# Patient Record
Sex: MALE | Race: BLACK OR AFRICAN AMERICAN | NOT HISPANIC OR LATINO | Employment: FULL TIME | URBAN - METROPOLITAN AREA
[De-identification: names, ages, dates, MRNs, and addresses within clinical notes are randomized per-mention and may not be internally consistent; named-entity substitution may affect disease eponyms.]

---

## 2024-10-07 ENCOUNTER — HOSPITAL ENCOUNTER (EMERGENCY)
Facility: HOSPITAL | Age: 44
Discharge: HOME | End: 2024-10-07
Attending: EMERGENCY MEDICINE

## 2024-10-07 VITALS
RESPIRATION RATE: 18 BRPM | HEART RATE: 61 BPM | SYSTOLIC BLOOD PRESSURE: 130 MMHG | OXYGEN SATURATION: 98 % | HEIGHT: 74 IN | DIASTOLIC BLOOD PRESSURE: 84 MMHG | WEIGHT: 239.19 LBS | BODY MASS INDEX: 30.7 KG/M2 | TEMPERATURE: 98 F

## 2024-10-07 DIAGNOSIS — I10 CHRONIC HYPERTENSION: ICD-10-CM

## 2024-10-07 DIAGNOSIS — R10.9 ABDOMINAL PAIN: ICD-10-CM

## 2024-10-07 DIAGNOSIS — K40.90 LEFT INGUINAL HERNIA: Primary | ICD-10-CM

## 2024-10-07 LAB
ALBUMIN SERPL BCP-MCNC: 4.5 G/DL (ref 3.5–5.2)
ALP SERPL-CCNC: 56 U/L (ref 55–135)
ALT SERPL W/O P-5'-P-CCNC: 14 U/L (ref 10–44)
AMPHET+METHAMPHET UR QL: NEGATIVE
ANION GAP SERPL CALC-SCNC: 11 MMOL/L (ref 8–16)
AST SERPL-CCNC: 18 U/L (ref 10–40)
BARBITURATES UR QL SCN>200 NG/ML: NEGATIVE
BASOPHILS # BLD AUTO: 0.02 K/UL (ref 0–0.2)
BASOPHILS NFR BLD: 0.4 % (ref 0–1.9)
BENZODIAZ UR QL SCN>200 NG/ML: NEGATIVE
BILIRUB SERPL-MCNC: 0.7 MG/DL (ref 0.1–1)
BILIRUB UR QL STRIP: NEGATIVE
BUN SERPL-MCNC: 13 MG/DL (ref 6–20)
BZE UR QL SCN: NEGATIVE
CALCIUM SERPL-MCNC: 9.8 MG/DL (ref 8.7–10.5)
CANNABINOIDS UR QL SCN: NEGATIVE
CHLORIDE SERPL-SCNC: 106 MMOL/L (ref 95–110)
CLARITY UR: CLEAR
CO2 SERPL-SCNC: 23 MMOL/L (ref 23–29)
COLOR UR: YELLOW
CREAT SERPL-MCNC: 1.4 MG/DL (ref 0.5–1.4)
CREAT UR-MCNC: 147 MG/DL (ref 23–375)
DIFFERENTIAL METHOD BLD: ABNORMAL
EOSINOPHIL # BLD AUTO: 0 K/UL (ref 0–0.5)
EOSINOPHIL NFR BLD: 0.4 % (ref 0–8)
ERYTHROCYTE [DISTWIDTH] IN BLOOD BY AUTOMATED COUNT: 11.2 % (ref 11.5–14.5)
EST. GFR  (NO RACE VARIABLE): >60 ML/MIN/1.73 M^2
GLUCOSE SERPL-MCNC: 104 MG/DL (ref 70–110)
GLUCOSE UR QL STRIP: NEGATIVE
HCT VFR BLD AUTO: 40.7 % (ref 40–54)
HGB BLD-MCNC: 14.4 G/DL (ref 14–18)
HGB UR QL STRIP: NEGATIVE
IMM GRANULOCYTES # BLD AUTO: 0.01 K/UL (ref 0–0.04)
IMM GRANULOCYTES NFR BLD AUTO: 0.2 % (ref 0–0.5)
KETONES UR QL STRIP: NEGATIVE
LEUKOCYTE ESTERASE UR QL STRIP: NEGATIVE
LIPASE SERPL-CCNC: 74 U/L (ref 4–60)
LYMPHOCYTES # BLD AUTO: 1.3 K/UL (ref 1–4.8)
LYMPHOCYTES NFR BLD: 25.2 % (ref 18–48)
MCH RBC QN AUTO: 29.7 PG (ref 27–31)
MCHC RBC AUTO-ENTMCNC: 35.4 G/DL (ref 32–36)
MCV RBC AUTO: 84 FL (ref 82–98)
METHADONE UR QL SCN>300 NG/ML: NEGATIVE
MONOCYTES # BLD AUTO: 0.3 K/UL (ref 0.3–1)
MONOCYTES NFR BLD: 6 % (ref 4–15)
NEUTROPHILS # BLD AUTO: 3.6 K/UL (ref 1.8–7.7)
NEUTROPHILS NFR BLD: 67.8 % (ref 38–73)
NITRITE UR QL STRIP: NEGATIVE
NRBC BLD-RTO: 0 /100 WBC
OPIATES UR QL SCN: NEGATIVE
PCP UR QL SCN>25 NG/ML: NEGATIVE
PH UR STRIP: 7 [PH] (ref 5–8)
PLATELET # BLD AUTO: 206 K/UL (ref 150–450)
PMV BLD AUTO: 10.1 FL (ref 9.2–12.9)
POTASSIUM SERPL-SCNC: 4.2 MMOL/L (ref 3.5–5.1)
PROT SERPL-MCNC: 8.5 G/DL (ref 6–8.4)
PROT UR QL STRIP: NEGATIVE
RBC # BLD AUTO: 4.85 M/UL (ref 4.6–6.2)
SODIUM SERPL-SCNC: 140 MMOL/L (ref 136–145)
SP GR UR STRIP: >1.03 (ref 1–1.03)
TOXICOLOGY INFORMATION: NORMAL
TROPONIN I SERPL DL<=0.01 NG/ML-MCNC: <0.006 NG/ML (ref 0–0.03)
URN SPEC COLLECT METH UR: ABNORMAL
UROBILINOGEN UR STRIP-ACNC: NEGATIVE EU/DL
WBC # BLD AUTO: 5.32 K/UL (ref 3.9–12.7)

## 2024-10-07 PROCEDURE — 80307 DRUG TEST PRSMV CHEM ANLYZR: CPT | Performed by: EMERGENCY MEDICINE

## 2024-10-07 PROCEDURE — 63600175 PHARM REV CODE 636 W HCPCS: Performed by: EMERGENCY MEDICINE

## 2024-10-07 PROCEDURE — 84484 ASSAY OF TROPONIN QUANT: CPT | Performed by: EMERGENCY MEDICINE

## 2024-10-07 PROCEDURE — 99285 EMERGENCY DEPT VISIT HI MDM: CPT | Mod: 25

## 2024-10-07 PROCEDURE — 93005 ELECTROCARDIOGRAM TRACING: CPT

## 2024-10-07 PROCEDURE — 93010 ELECTROCARDIOGRAM REPORT: CPT | Mod: ,,, | Performed by: STUDENT IN AN ORGANIZED HEALTH CARE EDUCATION/TRAINING PROGRAM

## 2024-10-07 PROCEDURE — 85025 COMPLETE CBC W/AUTO DIFF WBC: CPT | Performed by: EMERGENCY MEDICINE

## 2024-10-07 PROCEDURE — 81003 URINALYSIS AUTO W/O SCOPE: CPT | Mod: 59 | Performed by: EMERGENCY MEDICINE

## 2024-10-07 PROCEDURE — 96374 THER/PROPH/DIAG INJ IV PUSH: CPT

## 2024-10-07 PROCEDURE — 25500020 PHARM REV CODE 255: Performed by: EMERGENCY MEDICINE

## 2024-10-07 PROCEDURE — 83690 ASSAY OF LIPASE: CPT | Performed by: EMERGENCY MEDICINE

## 2024-10-07 PROCEDURE — 80053 COMPREHEN METABOLIC PANEL: CPT | Performed by: EMERGENCY MEDICINE

## 2024-10-07 PROCEDURE — 25000003 PHARM REV CODE 250: Performed by: EMERGENCY MEDICINE

## 2024-10-07 RX ORDER — SUCRALFATE 1 G/10ML
1 SUSPENSION ORAL
Status: COMPLETED | OUTPATIENT
Start: 2024-10-07 | End: 2024-10-07

## 2024-10-07 RX ORDER — METOCLOPRAMIDE HYDROCHLORIDE 5 MG/ML
10 INJECTION INTRAMUSCULAR; INTRAVENOUS
Status: COMPLETED | OUTPATIENT
Start: 2024-10-07 | End: 2024-10-07

## 2024-10-07 RX ORDER — AMLODIPINE BESYLATE 5 MG/1
10 TABLET ORAL
Status: COMPLETED | OUTPATIENT
Start: 2024-10-07 | End: 2024-10-07

## 2024-10-07 RX ADMIN — IOHEXOL 100 ML: 350 INJECTION, SOLUTION INTRAVENOUS at 11:10

## 2024-10-07 RX ADMIN — AMLODIPINE BESYLATE 10 MG: 5 TABLET ORAL at 10:10

## 2024-10-07 RX ADMIN — SUCRALFATE 1 G: 1 SUSPENSION ORAL at 09:10

## 2024-10-07 RX ADMIN — METOCLOPRAMIDE 10 MG: 5 INJECTION, SOLUTION INTRAMUSCULAR; INTRAVENOUS at 09:10

## 2024-10-07 NOTE — ED PROVIDER NOTES
SCRIBE #1 NOTE: I, Dada Harding, am scribing for, and in the presence of, Neha Deluca MD. I have scribed the entire note.       History     Chief Complaint   Patient presents with    Abdominal Pain     Abd pain since 9/27 - was dx with pancreatitis and admitted since discharged pain has returned     Review of patient's allergies indicates:  No Known Allergies      History of Present Illness     HPI    10/7/2024, 9:32 AM  History obtained from the patient      History of Present Illness: Maki Luong is a 44 y.o. male patient who presents to the Emergency Department for evaluation of abd pain which onset gradually since 09/23/24. Pt went to [facility] and f/u with PCP; pt states the physicians are uncertain what could be causing the pt pain. Pt's pain was unimproved with 2 shots of morphine with his hospital visit. The pt was prescribed Bentyl and Zofran with no improvement of pain. Pt's pain is localized to the LUQ. Pt states he does not drink nor smoke.  Pt usually takes his BP medications in the morning but has not taken his Amlodipine this morning. Pain is worsened with breathing and eating solid foods. Pt has a decrease in appetite as a result. Pt has a s/p hx of cholecystectomy. Symptoms are constant and moderate in severity. No associated sxs. Patient denies any increased belching, no trouble urinating, no constipation, no N/V, fever, constipation, CP, back pain, dizziness, and all other sxs at this time. Pt is passing gas and has normal BMs with no black tarry or bloody stool. No further complaints or concerns at this time.       Arrival mode: Personal vehicle     PCP: Alma Malik NP        Past Medical History:  Past Medical History:   Diagnosis Date    Essential (primary) hypertension        Past Surgical History:  No past surgical history on file.      Family History:  No family history on file.    Social History:  Social History     Tobacco Use    Smoking status: Not on file    Smokeless tobacco:  Not on file   Substance and Sexual Activity    Alcohol use: Not on file    Drug use: Not on file    Sexual activity: Not on file        Review of Systems     Review of Systems   Constitutional:  Positive for appetite change (decreased). Negative for chills and fever.   HENT:  Negative for congestion and sore throat.    Respiratory:  Negative for cough and shortness of breath.    Cardiovascular:  Negative for chest pain.   Gastrointestinal:  Positive for abdominal pain (LUQ). Negative for blood in stool, constipation, nausea and vomiting.        (-) belching   Genitourinary:  Negative for dysuria.   Musculoskeletal:  Negative for back pain.   Skin:  Negative for rash.   Neurological:  Negative for dizziness, weakness, numbness and headaches.   Hematological:  Does not bruise/bleed easily.   All other systems reviewed and are negative.       Physical Exam     Initial Vitals [10/07/24 0919]   BP Pulse Resp Temp SpO2   (!) 161/100 82 18 97.9 °F (36.6 °C) 99 %      MAP       --          Physical Exam  Nursing Notes and Vital Signs Reviewed.  Constitutional: Patient is in no acute distress. Anxious. Well-developed and well-nourished.  Head: Atraumatic. Normocephalic.  Eyes: PERRL. EOM intact. Conjunctivae are not pale. No scleral icterus.  ENT: Mucous membranes are moist. Oropharynx is clear and symmetric.    Neck: Supple. Full ROM. No lymphadenopathy.  Cardiovascular: Regular rate. Regular rhythm. No murmurs, rubs, or gallops. Distal pulses are 2+ and symmetric.  Pulmonary/Chest: No respiratory distress. Clear to auscultation bilaterally. No wheezing or rales.  Abdominal: Soft and non-distended.  There is epigastric and generalized lower abd tenderness.  No rebound, guarding, or rigidity. Good bowel sounds.  Genitourinary: No CVA tenderness Reducible left inguinal hernia.   Musculoskeletal: Moves all extremities. No obvious deformities. No edema. No calf tenderness.  Skin: Warm and dry.  Neurological:  Alert, awake, and  "appropriate.  Normal speech.  No acute focal neurological deficits are appreciated.  Psychiatric: Normal affect. Anxious. Good eye contact. Appropriate in content.     ED Course   Procedures  ED Vital Signs:  Vitals:    10/07/24 0919 10/07/24 0930 10/07/24 1015 10/07/24 1030   BP: (!) 161/100 (!) 184/108 (!) 144/83 (!) 146/86   Pulse: 82  64 62   Resp: 18  11 10   Temp: 97.9 °F (36.6 °C)      TempSrc: Oral      SpO2: 99%  96% 97%   Weight: 108.5 kg (239 lb 3.2 oz)      Height: 6' 2" (1.88 m)       10/07/24 1100 10/07/24 1245 10/07/24 1300   BP: (!) 143/92 131/84 130/84   Pulse: 64 65 61   Resp: 12 18 18   Temp:      TempSrc:      SpO2: 98% 97% 98%   Weight:      Height:          Abnormal Lab Results:  Labs Reviewed   CBC W/ AUTO DIFFERENTIAL - Abnormal       Result Value    WBC 5.32      RBC 4.85      Hemoglobin 14.4      Hematocrit 40.7      MCV 84      MCH 29.7      MCHC 35.4      RDW 11.2 (*)     Platelets 206      MPV 10.1      Immature Granulocytes 0.2      Gran # (ANC) 3.6      Immature Grans (Abs) 0.01      Lymph # 1.3      Mono # 0.3      Eos # 0.0      Baso # 0.02      nRBC 0      Gran % 67.8      Lymph % 25.2      Mono % 6.0      Eosinophil % 0.4      Basophil % 0.4      Differential Method Automated     COMPREHENSIVE METABOLIC PANEL - Abnormal    Sodium 140      Potassium 4.2      Chloride 106      CO2 23      Glucose 104      BUN 13      Creatinine 1.4      Calcium 9.8      Total Protein 8.5 (*)     Albumin 4.5      Total Bilirubin 0.7      Alkaline Phosphatase 56      AST 18      ALT 14      eGFR >60      Anion Gap 11     LIPASE - Abnormal    Lipase 74 (*)    URINALYSIS, REFLEX TO URINE CULTURE - Abnormal    Specimen UA Urine, Clean Catch      Color, UA Yellow      Appearance, UA Clear      pH, UA 7.0      Specific Gravity, UA >1.030 (*)     Protein, UA Negative      Glucose, UA Negative      Ketones, UA Negative      Bilirubin (UA) Negative      Occult Blood UA Negative      Nitrite, UA Negative      " Urobilinogen, UA Negative      Leukocytes, UA Negative      Narrative:     Specimen Source->Urine   DRUG SCREEN PANEL, URINE EMERGENCY    Benzodiazepines Negative      Methadone metabolites Negative      Cocaine (Metab.) Negative      Opiate Scrn, Ur Negative      Barbiturate Screen, Ur Negative      Amphetamine Screen, Ur Negative      THC Negative      Phencyclidine Negative      Creatinine, Urine 147.0      Toxicology Information SEE COMMENT      Narrative:     Specimen Source->Urine   TROPONIN I    Troponin I <0.006          All Lab Results:  Results for orders placed or performed during the hospital encounter of 10/07/24   CBC W/ AUTO DIFFERENTIAL    Collection Time: 10/07/24  9:43 AM   Result Value Ref Range    WBC 5.32 3.90 - 12.70 K/uL    RBC 4.85 4.60 - 6.20 M/uL    Hemoglobin 14.4 14.0 - 18.0 g/dL    Hematocrit 40.7 40.0 - 54.0 %    MCV 84 82 - 98 fL    MCH 29.7 27.0 - 31.0 pg    MCHC 35.4 32.0 - 36.0 g/dL    RDW 11.2 (L) 11.5 - 14.5 %    Platelets 206 150 - 450 K/uL    MPV 10.1 9.2 - 12.9 fL    Immature Granulocytes 0.2 0.0 - 0.5 %    Gran # (ANC) 3.6 1.8 - 7.7 K/uL    Immature Grans (Abs) 0.01 0.00 - 0.04 K/uL    Lymph # 1.3 1.0 - 4.8 K/uL    Mono # 0.3 0.3 - 1.0 K/uL    Eos # 0.0 0.0 - 0.5 K/uL    Baso # 0.02 0.00 - 0.20 K/uL    nRBC 0 0 /100 WBC    Gran % 67.8 38.0 - 73.0 %    Lymph % 25.2 18.0 - 48.0 %    Mono % 6.0 4.0 - 15.0 %    Eosinophil % 0.4 0.0 - 8.0 %    Basophil % 0.4 0.0 - 1.9 %    Differential Method Automated    Comp. Metabolic Panel    Collection Time: 10/07/24  9:43 AM   Result Value Ref Range    Sodium 140 136 - 145 mmol/L    Potassium 4.2 3.5 - 5.1 mmol/L    Chloride 106 95 - 110 mmol/L    CO2 23 23 - 29 mmol/L    Glucose 104 70 - 110 mg/dL    BUN 13 6 - 20 mg/dL    Creatinine 1.4 0.5 - 1.4 mg/dL    Calcium 9.8 8.7 - 10.5 mg/dL    Total Protein 8.5 (H) 6.0 - 8.4 g/dL    Albumin 4.5 3.5 - 5.2 g/dL    Total Bilirubin 0.7 0.1 - 1.0 mg/dL    Alkaline Phosphatase 56 55 - 135 U/L    AST 18  10 - 40 U/L    ALT 14 10 - 44 U/L    eGFR >60 >60 mL/min/1.73 m^2    Anion Gap 11 8 - 16 mmol/L   Lipase    Collection Time: 10/07/24  9:43 AM   Result Value Ref Range    Lipase 74 (H) 4 - 60 U/L   Troponin I    Collection Time: 10/07/24  9:43 AM   Result Value Ref Range    Troponin I <0.006 0.000 - 0.026 ng/mL   EKG 12-lead    Collection Time: 10/07/24  9:48 AM   Result Value Ref Range    QRS Duration 70 ms    [facility] QTC Calculation 424 ms   Urinalysis, Reflex to Urine Culture Urine, Clean Catch    Collection Time: 10/07/24 12:49 PM    Specimen: Urine   Result Value Ref Range    Specimen UA Urine, Clean Catch     Color, UA Yellow Yellow, Straw, Sho    Appearance, UA Clear Clear    pH, UA 7.0 5.0 - 8.0    Specific Gravity, UA >1.030 (A) 1.005 - 1.030    Protein, UA Negative Negative    Glucose, UA Negative Negative    Ketones, UA Negative Negative    Bilirubin (UA) Negative Negative    Occult Blood UA Negative Negative    Nitrite, UA Negative Negative    Urobilinogen, UA Negative <2.0 EU/dL    Leukocytes, UA Negative Negative   Drug screen panel, emergency    Collection Time: 10/07/24 12:49 PM   Result Value Ref Range    Benzodiazepines Negative Negative    Methadone metabolites Negative Negative    Cocaine (Metab.) Negative Negative    Opiate Scrn, Ur Negative Negative    Barbiturate Screen, Ur Negative Negative    Amphetamine Screen, Ur Negative Negative    THC Negative Negative    Phencyclidine Negative Negative    Creatinine, Urine 147.0 23.0 - 375.0 mg/dL    Toxicology Information SEE COMMENT          Imaging Results:  Imaging Results              CT Abdomen Pelvis With IV Contrast NO Oral Contrast (Final result)  Result time 10/07/24 12:14:24      Final result by Hebert Hassan MD (10/07/24 12:14:24)                   Impression:      Large left inguinal hernia containing portion of sigmoid colon without evidence of obstruction.    All CT scans at this facility use dose modulation, iterative reconstruction,  and/or weight based dosing when appropriate to reduce radiation dose to as low as reasonable achievable.      Electronically signed by: Hebert Hassan MD  Date:    10/07/2024  Time:    12:14               Narrative:    EXAMINATION:  CT ABDOMEN PELVIS WITH IV CONTRAST    CLINICAL HISTORY:  LLQ abdominal pain;    TECHNIQUE:  Low dose axial images, sagittal and coronal reformations were obtained from the lung bases to the pubic symphysis following the IV administration of 100 mL of Omnipaque 350.  Oral contrast was not administered.    COMPARISON:  10/07/2024, 09/23/2024    FINDINGS:  Heart: Normal size. No effusion.    Lung Bases: Clear.    Liver: Normal size and attenuation. No focal lesions.  Portal vein is patent.  9 mm cyst within segment 5.    Gallbladder: Post cholecystectomy.    Bile Ducts: No dilatation.    Pancreas: No mass. No peripancreatic fat stranding.    Spleen: Normal.    Adrenals: Normal.    Kidneys/Ureters: Normal enhancement.  No mass or  hydroureteronephrosis.    Bladder: No wall thickening.    Reproductive organs: Normal.    GI Tract/Mesentery: No evidence of bowel obstruction or inflammation.  Appendix appears normal.    Peritoneal Space: No ascites or free air.    Retroperitoneum: No significant adenopathy.    Abdominal wall: Large left inguinal hernia containing portion of sigmoid colon without evidence of obstruction.    Vasculature: No aneurysm.    Bones: No acute fracture. No suspicious lytic or sclerotic lesions.                                       X-Ray Abdomen Flat And Erect (Final result)  Result time 10/07/24 09:51:07      Final result by Aung Lima MD (10/07/24 09:51:07)                   Impression:      1.  Negative for acute process.    2.  Incidental findings as noted above.      Electronically signed by: Aung Lima MD  Date:    10/07/2024  Time:    09:51               Narrative:    EXAMINATION:  XR ABDOMEN FLAT AND ERECT    CLINICAL HISTORY:  Abdominal  Pain;    TECHNIQUE:  Flat and erect AP views of the abdomen were performed.    COMPARISON:  None    FINDINGS:  Appropriate stool burden.  Normal bowel gas pattern.  Negative for free air.    Cholecystectomy clips.  There are phleboliths versus ureteroliths some overlying the pelvis.  Lung bases are clear.  Mild sclerosis of the superior acetabula.  Negative for osseous abnormalities otherwise.                                       The EKG was ordered, reviewed, and independently interpreted by the ED provider.  Interpretation time: 09:48  Rate: 72 BPM  Rhythm: normal sinus rhythm  Interpretation: No acute ST changes. No STEMI.             The Emergency Provider reviewed the vital signs and test results, which are outlined above.     ED Discussion       12:58 PM: Reassessed pt at this time. Discussed with pt all pertinent ED information and results. Discussed pt dx and plan of tx. Gave pt all f/u and return to the ED instructions. All questions and concerns were addressed at this time. Pt expresses understanding of information and instructions, and is comfortable with plan to discharge. Pt is stable for discharge.    I discussed with patient and/or family/caretaker that evaluation in the ED does not suggest any emergent or life threatening medical conditions requiring immediate intervention beyond what was provided in the ED, and I believe patient is safe for discharge.  Regardless, an unremarkable evaluation in the ED does not preclude the development or presence of a serious of life threatening condition. As such, patient was instructed to return immediately for any worsening or change in current symptoms.     ED Course as of 10/08/24 2308   Mon Oct 07, 2024   1013 WBC: 5.32 [AB]   1013 Hemoglobin: 14.4 [AB]   1013 Hematocrit: 40.7 [AB]   1013 Sodium: 140 [AB]   1013 Potassium: 4.2 [AB]   1013 BUN: 13 [AB]   1013 Creatinine: 1.4 [AB]   1013 ALT: 14 [AB]   1013 AST: 18 [AB]   1013 Anion Gap: 11 [AB]   1013  Lipase(!): 74 [AB]   1013 Troponin I: <0.006  ECG reviewed and no ischemic changes, xray abdomen otherwise normal, wbc normal, cmp normal, lipase mildly elevated but lower than baseline, awaiting UA and CT abd/pelvis, BP elevated but did not take home BP meds today [AB]      ED Course User Index  [AB] Neha Deluca MD     Medical Decision Making  DDX: 1. Diverticulitis 2. Kidney Stone 3. Inguinal Hernia 4. Bowel obstruction    Lab work reviewed and otherwise normal, lipase mildly elevated but lower than baseline, xray abdomen normal, UA normal, CT reviewed and shows large left inguinal hernia, no concerns for obstruction or incarceration, no significant tenderness on exam, no distress noted, initially hypertensive but improved with normal home po meds, overall feel he is safe for discharge, referral placed for gen surgery for follow up of inguinal hernia, advised to avoid heavy lifting, straining. Reasons to return given.     Amount and/or Complexity of Data Reviewed  External Data Reviewed: labs, radiology, ECG and notes.     Details: Reviewed ER Visit from 9/23 at [facility] for same complaint, had lab work done, CT abd. Pelvis, labs otherwise normal, except lipase 84, CT abd pelvis showed left inguinal hernia, no obstruction or incarceration, CT otherwise normal.   Labs: ordered. Decision-making details documented in ED Course.  Radiology: ordered. Decision-making details documented in ED Course.  ECG/medicine tests: ordered and independent interpretation performed. Decision-making details documented in ED Course.     Details: No ischemic changes    Risk  Prescription drug management.                ED Medication(s):  Medications   sucralfate 100 mg/mL suspension 1 g (1 g Oral Given 10/7/24 0942)   metoclopramide injection 10 mg (10 mg Intravenous Given 10/7/24 0942)   amLODIPine tablet 10 mg (10 mg Oral Given 10/7/24 1041)   iohexoL (OMNIPAQUE 350) injection 100 mL (100 mLs Intravenous Given 10/7/24 1143)        There are no discharge medications for this patient.       Follow-up Information       [facility]. Schedule an appointment as soon as possible for a visit in 3 days.    Specialty: General Surgery  Why: Return to the Emergency Room, If symptoms worsen  Contact information:  [address] [facility] [address]  [phone]                               Scribe Attestation:   Scribe #1: I performed the above scribed service and the documentation accurately describes the services I performed. I attest to the accuracy of the note.     Attending:   Physician Attestation Statement for Scribe #1: I, Neha Deluca MD, personally performed the services described in this documentation, as scribed by Dada Harding, in my presence, and it is both accurate and complete.           Clinical Impression       ICD-10-CM ICD-9-CM   1. Left inguinal hernia  K40.90 550.90   2. Abdominal pain  R10.9 789.00   3. Chronic hypertension  I10 401.9       Disposition:   Disposition: Discharged  Condition: Stable        Neha Deluca MD  10/08/24 3123

## 2024-10-08 LAB
OHS QRS DURATION: 70 MS
OHS QTC CALCULATION: 424 MS

## 2024-10-14 ENCOUNTER — HOSPITAL ENCOUNTER (OUTPATIENT)
Dept: CARDIOLOGY | Facility: HOSPITAL | Age: 44
Discharge: HOME | End: 2024-10-14
Attending: SURGERY
Payer: MEDICAID

## 2024-10-14 ENCOUNTER — ANESTHESIA EVENT (OUTPATIENT)
Dept: SURGERY | Facility: HOSPITAL | Age: 44
End: 2024-10-14
Payer: MEDICAID

## 2024-10-14 ENCOUNTER — OFFICE VISIT (OUTPATIENT)
Dept: SURGERY | Facility: CLINIC | Age: 44
End: 2024-10-14
Payer: MEDICAID

## 2024-10-14 ENCOUNTER — TELEPHONE (OUTPATIENT)
Dept: PREADMISSION TESTING | Facility: HOSPITAL | Age: 44
End: 2024-10-14
Payer: MEDICAID

## 2024-10-14 VITALS
HEART RATE: 74 BPM | HEIGHT: 74 IN | BODY MASS INDEX: 30.92 KG/M2 | SYSTOLIC BLOOD PRESSURE: 161 MMHG | DIASTOLIC BLOOD PRESSURE: 91 MMHG | TEMPERATURE: 97 F | WEIGHT: 240.94 LBS

## 2024-10-14 DIAGNOSIS — K40.90 LEFT INGUINAL HERNIA: Primary | ICD-10-CM

## 2024-10-14 DIAGNOSIS — K40.90 LEFT INGUINAL HERNIA: ICD-10-CM

## 2024-10-14 PROCEDURE — 93005 ELECTROCARDIOGRAM TRACING: CPT

## 2024-10-14 PROCEDURE — 93010 ELECTROCARDIOGRAM REPORT: CPT | Mod: ,,, | Performed by: INTERNAL MEDICINE

## 2024-10-14 PROCEDURE — 99214 OFFICE O/P EST MOD 30 MIN: CPT | Mod: PBBFAC,25 | Performed by: SURGERY

## 2024-10-14 RX ORDER — CEFAZOLIN SODIUM 2 G/50ML
2 SOLUTION INTRAVENOUS
Status: CANCELLED | OUTPATIENT
Start: 2024-10-14

## 2024-10-14 RX ORDER — SODIUM CHLORIDE 9 MG/ML
INJECTION, SOLUTION INTRAVENOUS CONTINUOUS
Status: CANCELLED | OUTPATIENT
Start: 2024-10-14

## 2024-10-14 RX ORDER — AMLODIPINE BESYLATE 5 MG/1
5 TABLET ORAL DAILY
COMMUNITY

## 2024-10-14 NOTE — PROGRESS NOTES
"History & Physical    Subjective     History of Present Illness:  Patient is a 44 y.o. male referred for left inguinal hernia.  He reports bulge down his left groin with intermittent discomfort right above this area.  Recently underwent CT scan showing left inguinal hernia containing sigmoid colon.  Prior to these episodes he was having intermittent epigastric pain and diagnosed with pancreatitis.    No chief complaint on file.      Review of patient's allergies indicates:  No Known Allergies    Current Outpatient Medications   Medication Sig Dispense Refill    amLODIPine (NORVASC) 5 MG tablet Take 5 mg by mouth once daily.       No current facility-administered medications for this visit.       Past Medical History:   Diagnosis Date    Essential (primary) hypertension      No past surgical history on file.  No family history on file.        Review of Systems:  Review of Systems   Constitutional:  Negative for chills, fever and unexpected weight change.   HENT:  Negative for congestion.    Eyes:  Negative for visual disturbance.   Respiratory:  Negative for shortness of breath.    Cardiovascular:  Negative for chest pain.   Gastrointestinal:  Negative for abdominal distention, abdominal pain, constipation, nausea, rectal pain and vomiting.   Genitourinary:  Negative for dysuria.   Musculoskeletal:  Negative for arthralgias.   Skin:  Negative for rash.   Neurological:  Negative for light-headedness.   Hematological:  Negative for adenopathy.          Objective     Vital Signs (Most Recent)  Temp: 97.3 °F (36.3 °C) (10/14/24 1425)  Pulse: 74 (10/14/24 1425)  BP: (!) 161/91 (10/14/24 1425)  6' 2" (1.88 m)  109.3 kg (240 lb 15.4 oz)     Physical Exam:  Physical Exam  Vitals reviewed.   Constitutional:       Appearance: He is well-developed.   HENT:      Head: Normocephalic and atraumatic.   Cardiovascular:      Rate and Rhythm: Normal rate.   Pulmonary:      Effort: Pulmonary effort is normal.   Abdominal:      General: " There is no distension.      Palpations: Abdomen is soft.      Tenderness: There is no abdominal tenderness.      Hernia: A hernia (large left inguinal hernia extending to scrotum, reducible when lying down) is present.   Musculoskeletal:      Cervical back: Normal range of motion and neck supple.   Skin:     General: Skin is warm and dry.   Neurological:      Mental Status: He is alert and oriented to person, place, and time.         Diagnostic Results:  CT:   Impression:  Large left inguinal hernia containing portion of sigmoid colon without evidence of obstruction.       Assessment and Plan     44-year-old male with left inguinal hernia    PLAN:    Large left inguinal hernia containing sigmoid colon  Robotic left explore right inguinal hernia repair with mesh  Preop:  CBC, CMP, EKG  Discussed risks and benefits of inguinal hernia repair including but not limited to:  Pain, bleeding, and infection, injury to spermatic cord, testicular atrophy or loss, nerve pain which may be long lasting, recurrence, need for further surgery     History of pancreatitis - discussed difference in pain/symptoms.  Hernia repair would not affect symptoms related to pancreatitis    45 minutes of total time spent on the encounter, which includes face to face time and non-face to face time preparing to see the patient (eg, review of tests), Obtaining and/or reviewing separately obtained history, Documenting clinical information in the electronic or other health record, Independently interpreting results (not separately reported) and communicating results to the patient/family/caregiver, or Care coordination (not separately reported).

## 2024-10-14 NOTE — TELEPHONE ENCOUNTER
Pre op instructions reviewed with Pt over telephone, verbalized understanding.    Procedure Date: 10/15/24  Arrival Time:  PLEASE ARRIVE AT 7:30 AM   Address:   [facility] (Off [facility], 2nd Building on the left)  [address] [place] [address] [place] [address]  >>>Please enter through front entrance Lobby of 1st floor to Registration desk<<<      !!!INSTRUCTIONS IMPORTANT!!!  DO NOT Eat, Drink, or Smoke after 12 midnight unless instructed otherwise by your Surgeon. OK to brush teeth, no gum, candy or mints!    >>>MEDICATION INSTRUCTIONS<<<: Morning of Surgery, take small sip of water with ONLY these medications:  AMLODIPINE    *Diabetic/ Prediabetic Patients: !!!If you take diabetic or weight loss medication, Do NOT take morning of surgery unless instructed by Doctor!!!  Metformin to be stopped 24 hrs prior to surgery.   Long Acting Insulin Instructions: HOLD the night before surgery unless instructed differently by Provider!  Ozempic/ Mounjaro/ Wegovy/ Trulicity/ Semaglutide injections or weight loss medication to be stopped 7 days prior to surgery.    !!!STOP ALL Aspirins, NSAIDS, WEIGHT LOSS INJECTIONS/PILLS, Herbal supplements, & Vitamins 7 DAYS BEFORE SURGERY!!!    ____  Avoid Alcoholic beverages 3 days prior to surgery, as it can thin the blood.  ____  NO Acrylic/fake nails or nail polish worn day of surgery (specifically hand/arm & foot surgeries).  ____  NO powder, lotions, deodorants, oils or cream on body.  ____  Remove all jewelry & piercings & foreign objects before arrival & leave at home.  ____  Remove Dentures, Hearing Aids & Contact Lens prior to surgery.  ____  Bring photo ID and insurance information to hospital (Leave Valuables at Home).  ____  If going home the same day, arrange for a ride home. You will not be able to drive for 24 hrs if Anesthesia was used.   ____  Females (ages 11-60): may need to give a urine sample the morning of surgery; please see Pre op Nurse prior to using the  restroom.  ____  Males: Stop ED medications (Viagra, Cialis) 24 hrs prior to surgery.  ____  Wear clean, loose fitting clothing to allow for dressings/ bandages.      Bathing Instructions:    -Shower with anti-bacterial Soap (Hibiclens or Dial) the night before surgery and the morning of.   -Do not use Hibiclens on your face or genitals.   -Apply clean clothes after shower.  -Do not shave your face or body 2 days prior to surgery unless instructed otherwise by your Surgeon.  -Do not shave pubic hair 7 days prior to surgery (gyn pt's).    [facility] Visitor/Ride Policy:  Only 2 adults allowed in pre op/recovery area during your procedure. You MUST HAVE A RIDE HOME from a responsible adult that you know and trust. Medical Transport, Uber or Lyft can ONLY be used if patient has a responsible adult to accompany them during ride home.       *Signs and symptoms of Infection Before or After Surgery:               !!!If you experience any fever, chills, nausea/ vomiting, foul odor/ excessive drainage from surgical site, flu-like symptoms, new wounds or cuts, PLEASE CALL THE SURGEON OFFICE at [phone] or SEND MESSAGE THROUGH [facility] PORTAL!!!     *If you are running late the morning of surgery, please call the Hospital Surgery Dept @ [phone].     *Billing questions:  [phone]     Thank you,  -[facility] Pre Admit Dept.  [phone] or [phone]  M-F 7:30 am-4:00 pm (Closed Major Holidays)

## 2024-10-15 ENCOUNTER — HOSPITAL ENCOUNTER (OUTPATIENT)
Facility: HOSPITAL | Age: 44
Discharge: HOME | End: 2024-10-15
Attending: SURGERY | Admitting: SURGERY
Payer: MEDICAID

## 2024-10-15 ENCOUNTER — ANESTHESIA (OUTPATIENT)
Dept: SURGERY | Facility: HOSPITAL | Age: 44
End: 2024-10-15
Payer: MEDICAID

## 2024-10-15 VITALS
SYSTOLIC BLOOD PRESSURE: 127 MMHG | RESPIRATION RATE: 15 BRPM | WEIGHT: 240.63 LBS | OXYGEN SATURATION: 95 % | HEART RATE: 66 BPM | TEMPERATURE: 98 F | BODY MASS INDEX: 30.88 KG/M2 | DIASTOLIC BLOOD PRESSURE: 72 MMHG | HEIGHT: 74 IN

## 2024-10-15 DIAGNOSIS — K40.90 LEFT INGUINAL HERNIA: Primary | ICD-10-CM

## 2024-10-15 LAB
OHS QRS DURATION: 76 MS
OHS QTC CALCULATION: 399 MS

## 2024-10-15 PROCEDURE — 37000009 HC ANESTHESIA EA ADD 15 MINS: Performed by: SURGERY

## 2024-10-15 PROCEDURE — 71000033 HC RECOVERY, INTIAL HOUR: Performed by: SURGERY

## 2024-10-15 PROCEDURE — 63600175 PHARM REV CODE 636 W HCPCS: Performed by: SURGERY

## 2024-10-15 PROCEDURE — 37000008 HC ANESTHESIA 1ST 15 MINUTES: Performed by: SURGERY

## 2024-10-15 PROCEDURE — 27201423 OPTIME MED/SURG SUP & DEVICES STERILE SUPPLY: Performed by: SURGERY

## 2024-10-15 PROCEDURE — 71000039 HC RECOVERY, EACH ADD'L HOUR: Performed by: SURGERY

## 2024-10-15 PROCEDURE — 25000003 PHARM REV CODE 250

## 2024-10-15 PROCEDURE — 63600175 PHARM REV CODE 636 W HCPCS: Performed by: STUDENT IN AN ORGANIZED HEALTH CARE EDUCATION/TRAINING PROGRAM

## 2024-10-15 PROCEDURE — C1781 MESH (IMPLANTABLE): HCPCS | Performed by: SURGERY

## 2024-10-15 PROCEDURE — 71000015 HC POSTOP RECOV 1ST HR: Performed by: SURGERY

## 2024-10-15 PROCEDURE — 63600175 PHARM REV CODE 636 W HCPCS

## 2024-10-15 PROCEDURE — 25000003 PHARM REV CODE 250: Performed by: SURGERY

## 2024-10-15 PROCEDURE — 36000711: Performed by: SURGERY

## 2024-10-15 PROCEDURE — 49650 LAP ING HERNIA REPAIR INIT: CPT | Mod: 22,LT,, | Performed by: SURGERY

## 2024-10-15 PROCEDURE — 36000710: Performed by: SURGERY

## 2024-10-15 DEVICE — LAPAROSCOPIC SELF-FIXATING MESH, LEFT ANATOMICAL
Type: IMPLANTABLE DEVICE | Site: ABDOMEN | Status: FUNCTIONAL
Brand: PROGRIP

## 2024-10-15 RX ORDER — DEXAMETHASONE SODIUM PHOSPHATE 4 MG/ML
INJECTION, SOLUTION INTRA-ARTICULAR; INTRALESIONAL; INTRAMUSCULAR; INTRAVENOUS; SOFT TISSUE
Status: DISCONTINUED | OUTPATIENT
Start: 2024-10-15 | End: 2024-10-15

## 2024-10-15 RX ORDER — LIDOCAINE HYDROCHLORIDE 10 MG/ML
INJECTION, SOLUTION EPIDURAL; INFILTRATION; INTRACAUDAL; PERINEURAL
Status: DISCONTINUED | OUTPATIENT
Start: 2024-10-15 | End: 2024-10-15 | Stop reason: HOSPADM

## 2024-10-15 RX ORDER — HYDROMORPHONE HYDROCHLORIDE 1 MG/ML
0.2 INJECTION, SOLUTION INTRAMUSCULAR; INTRAVENOUS; SUBCUTANEOUS EVERY 5 MIN PRN
Status: DISCONTINUED | OUTPATIENT
Start: 2024-10-15 | End: 2024-10-15 | Stop reason: HOSPADM

## 2024-10-15 RX ORDER — GLUCAGON 1 MG
1 KIT INJECTION
Status: DISCONTINUED | OUTPATIENT
Start: 2024-10-15 | End: 2024-10-15 | Stop reason: HOSPADM

## 2024-10-15 RX ORDER — HYDROCODONE BITARTRATE AND ACETAMINOPHEN 5; 325 MG/1; MG/1
1 TABLET ORAL EVERY 4 HOURS PRN
Status: CANCELLED | OUTPATIENT
Start: 2024-10-15

## 2024-10-15 RX ORDER — ONDANSETRON HYDROCHLORIDE 2 MG/ML
INJECTION, SOLUTION INTRAVENOUS
Status: DISCONTINUED | OUTPATIENT
Start: 2024-10-15 | End: 2024-10-15

## 2024-10-15 RX ORDER — HYDROCODONE BITARTRATE AND ACETAMINOPHEN 10; 325 MG/1; MG/1
1 TABLET ORAL EVERY 4 HOURS PRN
Qty: 15 TABLET | Refills: 0 | Status: SHIPPED | OUTPATIENT
Start: 2024-10-15

## 2024-10-15 RX ORDER — KETAMINE HCL IN 0.9 % NACL 50 MG/5 ML
SYRINGE (ML) INTRAVENOUS
Status: DISCONTINUED | OUTPATIENT
Start: 2024-10-15 | End: 2024-10-15

## 2024-10-15 RX ORDER — DIPHENHYDRAMINE HYDROCHLORIDE 50 MG/ML
12.5 INJECTION INTRAMUSCULAR; INTRAVENOUS
Status: DISCONTINUED | OUTPATIENT
Start: 2024-10-15 | End: 2024-10-15 | Stop reason: HOSPADM

## 2024-10-15 RX ORDER — BUPIVACAINE HYDROCHLORIDE 2.5 MG/ML
INJECTION, SOLUTION EPIDURAL; INFILTRATION; INTRACAUDAL
Status: DISCONTINUED | OUTPATIENT
Start: 2024-10-15 | End: 2024-10-15 | Stop reason: HOSPADM

## 2024-10-15 RX ORDER — SODIUM CHLORIDE 9 MG/ML
INJECTION, SOLUTION INTRAVENOUS CONTINUOUS
Status: CANCELLED | OUTPATIENT
Start: 2024-10-15

## 2024-10-15 RX ORDER — SUCCINYLCHOLINE CHLORIDE 20 MG/ML
INJECTION INTRAMUSCULAR; INTRAVENOUS
Status: DISCONTINUED | OUTPATIENT
Start: 2024-10-15 | End: 2024-10-15

## 2024-10-15 RX ORDER — SODIUM CHLORIDE, SODIUM LACTATE, POTASSIUM CHLORIDE, CALCIUM CHLORIDE 600; 310; 30; 20 MG/100ML; MG/100ML; MG/100ML; MG/100ML
INJECTION, SOLUTION INTRAVENOUS CONTINUOUS PRN
Status: DISCONTINUED | OUTPATIENT
Start: 2024-10-15 | End: 2024-10-15

## 2024-10-15 RX ORDER — PROPOFOL 10 MG/ML
VIAL (ML) INTRAVENOUS
Status: DISCONTINUED | OUTPATIENT
Start: 2024-10-15 | End: 2024-10-15

## 2024-10-15 RX ORDER — MIDAZOLAM HYDROCHLORIDE 1 MG/ML
INJECTION INTRAMUSCULAR; INTRAVENOUS
Status: DISCONTINUED | OUTPATIENT
Start: 2024-10-15 | End: 2024-10-15

## 2024-10-15 RX ORDER — MEPERIDINE HYDROCHLORIDE 25 MG/ML
12.5 INJECTION INTRAMUSCULAR; INTRAVENOUS; SUBCUTANEOUS ONCE AS NEEDED
Status: DISCONTINUED | OUTPATIENT
Start: 2024-10-15 | End: 2024-10-15 | Stop reason: HOSPADM

## 2024-10-15 RX ORDER — OXYCODONE AND ACETAMINOPHEN 5; 325 MG/1; MG/1
1 TABLET ORAL
Status: DISCONTINUED | OUTPATIENT
Start: 2024-10-15 | End: 2024-10-15 | Stop reason: HOSPADM

## 2024-10-15 RX ORDER — LIDOCAINE HYDROCHLORIDE 10 MG/ML
INJECTION, SOLUTION EPIDURAL; INFILTRATION; INTRACAUDAL; PERINEURAL
Status: DISCONTINUED | OUTPATIENT
Start: 2024-10-15 | End: 2024-10-15

## 2024-10-15 RX ORDER — ONDANSETRON HYDROCHLORIDE 2 MG/ML
4 INJECTION, SOLUTION INTRAVENOUS EVERY 12 HOURS PRN
Status: CANCELLED | OUTPATIENT
Start: 2024-10-15

## 2024-10-15 RX ORDER — SODIUM CHLORIDE 9 MG/ML
INJECTION, SOLUTION INTRAVENOUS CONTINUOUS
Status: DISCONTINUED | OUTPATIENT
Start: 2024-10-15 | End: 2024-10-15 | Stop reason: HOSPADM

## 2024-10-15 RX ORDER — HYDROCODONE BITARTRATE AND ACETAMINOPHEN 10; 325 MG/1; MG/1
1 TABLET ORAL EVERY 4 HOURS PRN
Status: CANCELLED | OUTPATIENT
Start: 2024-10-15

## 2024-10-15 RX ORDER — ROCURONIUM BROMIDE 10 MG/ML
INJECTION, SOLUTION INTRAVENOUS
Status: DISCONTINUED | OUTPATIENT
Start: 2024-10-15 | End: 2024-10-15

## 2024-10-15 RX ORDER — ONDANSETRON HYDROCHLORIDE 2 MG/ML
4 INJECTION, SOLUTION INTRAVENOUS DAILY PRN
Status: DISCONTINUED | OUTPATIENT
Start: 2024-10-15 | End: 2024-10-15 | Stop reason: HOSPADM

## 2024-10-15 RX ORDER — KETOROLAC TROMETHAMINE 30 MG/ML
15 INJECTION, SOLUTION INTRAMUSCULAR; INTRAVENOUS EVERY 8 HOURS PRN
Status: DISCONTINUED | OUTPATIENT
Start: 2024-10-15 | End: 2024-10-15 | Stop reason: HOSPADM

## 2024-10-15 RX ORDER — FENTANYL CITRATE 50 UG/ML
INJECTION, SOLUTION INTRAMUSCULAR; INTRAVENOUS
Status: DISCONTINUED | OUTPATIENT
Start: 2024-10-15 | End: 2024-10-15

## 2024-10-15 RX ORDER — IBUPROFEN 800 MG/1
800 TABLET ORAL 3 TIMES DAILY PRN
Qty: 30 TABLET | Refills: 0 | Status: SHIPPED | OUTPATIENT
Start: 2024-10-15

## 2024-10-15 RX ADMIN — ROCURONIUM BROMIDE 20 MG: 10 SOLUTION INTRAVENOUS at 09:10

## 2024-10-15 RX ADMIN — SUCCINYLCHOLINE CHLORIDE 160 MG: 20 INJECTION, SOLUTION INTRAMUSCULAR; INTRAVENOUS; PARENTERAL at 09:10

## 2024-10-15 RX ADMIN — PROPOFOL 250 MG: 10 INJECTION, EMULSION INTRAVENOUS at 09:10

## 2024-10-15 RX ADMIN — FENTANYL CITRATE 100 MCG: 50 INJECTION, SOLUTION INTRAMUSCULAR; INTRAVENOUS at 09:10

## 2024-10-15 RX ADMIN — DEXAMETHASONE SODIUM PHOSPHATE 8 MG: 4 INJECTION, SOLUTION INTRA-ARTICULAR; INTRALESIONAL; INTRAMUSCULAR; INTRAVENOUS; SOFT TISSUE at 09:10

## 2024-10-15 RX ADMIN — FENTANYL CITRATE 50 MCG: 50 INJECTION, SOLUTION INTRAMUSCULAR; INTRAVENOUS at 11:10

## 2024-10-15 RX ADMIN — MIDAZOLAM HYDROCHLORIDE 2 MG: 1 INJECTION, SOLUTION INTRAMUSCULAR; INTRAVENOUS at 09:10

## 2024-10-15 RX ADMIN — CEFAZOLIN 2 G: 2 INJECTION, POWDER, FOR SOLUTION INTRAMUSCULAR; INTRAVENOUS at 09:10

## 2024-10-15 RX ADMIN — SUGAMMADEX 200 MG: 100 INJECTION, SOLUTION INTRAVENOUS at 11:10

## 2024-10-15 RX ADMIN — ROCURONIUM BROMIDE 20 MG: 10 SOLUTION INTRAVENOUS at 10:10

## 2024-10-15 RX ADMIN — ROCURONIUM BROMIDE 25 MG: 10 SOLUTION INTRAVENOUS at 09:10

## 2024-10-15 RX ADMIN — KETOROLAC TROMETHAMINE 15 MG: 30 INJECTION, SOLUTION INTRAMUSCULAR at 12:10

## 2024-10-15 RX ADMIN — Medication 30 MG: at 09:10

## 2024-10-15 RX ADMIN — ROCURONIUM BROMIDE 5 MG: 10 SOLUTION INTRAVENOUS at 09:10

## 2024-10-15 RX ADMIN — LIDOCAINE HYDROCHLORIDE 100 MG: 10 SOLUTION INTRAVENOUS at 09:10

## 2024-10-15 RX ADMIN — SODIUM CHLORIDE, SODIUM LACTATE, POTASSIUM CHLORIDE, AND CALCIUM CHLORIDE: 600; 310; 30; 20 INJECTION, SOLUTION INTRAVENOUS at 09:10

## 2024-10-15 RX ADMIN — ONDANSETRON 4 MG: 2 INJECTION INTRAMUSCULAR; INTRAVENOUS at 11:10

## 2024-10-15 RX ADMIN — Medication 20 MG: at 09:10

## 2024-10-15 NOTE — OP NOTE
[place] - Surgery ([facility])  Surgery Department  Operative Note    SUMMARY     Date of Procedure: 10/15/2024     Procedure:   Robotic left inguinal hernia repair (modifier 22)    Surgeons and Role:     * Gigi Gomez MD - Primary    Assisting Surgeon: None    Pre-Operative Diagnosis: Left inguinal hernia [K40.90]    Post-Operative Diagnosis: Post-Op Diagnosis Codes:     * Left inguinal hernia [K40.90]    Anesthesia: General    Operative Findings (including complications, if any):   Robotic left inguinal hernia repair (modifier 22)    Description of Technical Procedures:  Large indirect inguinal hernia extending into the scrotum      Estimated Blood Loss (EBL): 20 mL           Implants:   Implant Name Type Inv. Item Serial No.  Lot No. LRB No. Used Action   MESH PROGRIP LAP [id number] LEFT - [id number]  MESH PROGRIP LAP [id number] LEFT  [place] [id number] Left 1 Implanted       Specimens:   Specimen (24h ago, onward)      None                    Condition: Good    Disposition: PACU - hemodynamically stable.    Procedure in detail:   The patient was brought to the OR and underwent general anesthesia.  He was prepped and draped in usual sterile fashion.  An umbilical incision. Fascia was grasped and elevated. A veres needle was placed and abdomen insufflated to 15mmHg. An 8mm robotic port was placed  using the optiview technique through the umbilical hernia and no apparent injuries were noted. Two 8mm robotic ports were placed in the right and left mid abdomen. The robot was docked. The patient was then placed in a steep trendelenburg position was taken, and visualized the inguinal areas.      The left inguinal canal was inspected and a large indirect inguinal hernia was noted. The mobilization of the peritoneum was then commenced from the most lateral-inferior aspect and brought up well above the line of anterior superior iliac supine. The dissection was continued medially, identifying epigastric vessels,  umbilical vein remnant , while visualizing the defect. The dissection was continued all the way medially to the Jimenez's ligament, and visualized the pubic bone as well. The blunt dissection and sharp dissection was done to create a peritoneal flap. Then a ProGrip mesh left side was brought into the port along with to a strata fix. The mesh was placed in the appropriate position. The mesh sat nicely covering inguinal defect. The mesh was then fixated onto the internal oblique and transversalis muscle and pubic tubercle. The peritoneal flap was closed with 2-0 statafix suture. The sutures were removed completely.     Local anesthetic was injected.  All incisions were then closed with 4-0 Monocryl.  Dermabond was applied.  The patient tolerated procedure in stable and satisfactory condition was transferred to recovery postoperatively.  I feel this qualifies for a modifier 22 due to the added complexity from the significantly large inguinal hernia extending into the scrotum.

## 2024-10-15 NOTE — ANESTHESIA PREPROCEDURE EVALUATION
10/15/2024  Maki Luong is a 44 y.o., male for Lt inguinal hernia repair.     There is no problem list on file for this patient.    Past Medical History:   Diagnosis Date    Essential (primary) hypertension      Past Surgical History:   Procedure Laterality Date    CHOLECYSTECTOMY         Chemistry        Component Value Date/Time     10/14/2024 1601    K 4.4 10/14/2024 1601     10/14/2024 1601    CO2 28 10/14/2024 1601    BUN 14 10/14/2024 1601    CREATININE 1.2 10/14/2024 1601    GLU 84 10/14/2024 1601        Component Value Date/Time    CALCIUM 9.8 10/14/2024 1601    ALKPHOS 59 10/14/2024 1601    AST 14 10/14/2024 1601    ALT 12 10/14/2024 1601    BILITOT 0.8 10/14/2024 1601    ESTGFRAFRICA 88 11/30/2020 1627        Lab Results   Component Value Date    WBC 6.65 10/14/2024    HGB 14.4 10/14/2024    HCT 41.5 10/14/2024    MCV 86 10/14/2024     10/14/2024         Pre-op Assessment    I have reviewed the Patient Summary Reports.    I have reviewed the NPO Status.   I have reviewed the Medications.     Review of Systems  Anesthesia Hx:  No problems with previous Anesthesia   History of prior surgery of interest to airway management or planning:  Previous anesthesia: General          Denies Personal Hx of Anesthesia complications.                    Social:  Non-Smoker       Hematology/Oncology:  Hematology Normal   Oncology Normal                                   Cardiovascular:     Hypertension              ECG has been reviewed. Normal sinus rhythm   Normal ECG   When compared with ECG of 07-OCT-2024 09:48,   No significant change was found                              Pulmonary:  Pulmonary Normal                       Renal/:  Renal/ Normal                 Musculoskeletal:  Musculoskeletal Normal                Neurological:  Neurology Normal                                       Endocrine:  Endocrine Normal    BMI 31            Physical Exam  General: Well nourished, Cooperative, Alert and Oriented    Airway:  Mallampati: II   Mouth Opening: Normal  TM Distance: Normal  Tongue: Normal  Neck ROM: Normal ROM    Dental:  Intact  Patient denies any currently loose or chipped teeth; Patient denies any removable dental appliances        Anesthesia Plan  Type of Anesthesia, risks & benefits discussed:    Anesthesia Type: Gen ETT  Intra-op Monitoring Plan: Standard ASA Monitors  Post Op Pain Control Plan: multimodal analgesia and IV/PO Opioids PRN  Induction:  IV  Airway Plan: Direct, Post-Induction  Informed Consent: Informed consent signed with the Patient and all parties understand the risks and agree with anesthesia plan.  All questions answered.   ASA Score: 2  Day of Surgery Review of History & Physical: H&P Update referred to the surgeon/provider.    Ready For Surgery From Anesthesia Perspective.     .

## 2024-10-15 NOTE — DISCHARGE SUMMARY
[facility] - Surgery (Hospital)  Discharge Note  Short Stay    Procedure(s) (LRB):  XI ROBOTIC REPAIR, HERNIA, INGUINAL (Left)      OUTCOME: Patient tolerated treatment/procedure well without complication and is now ready for discharge.    DISPOSITION: Home or Self Care    FINAL DIAGNOSIS:  Left inguinal hernia    FOLLOWUP: In clinic    DISCHARGE INSTRUCTIONS:    Discharge Procedure Orders   Diet general     Lifting restrictions   Order Comments: No heavy lifting > 10 lbs or strenuous activity x 6 weeks     Call MD for:  temperature >100.4     Call MD for:  persistent nausea and vomiting     Call MD for:  severe uncontrolled pain     Call MD for:  difficulty breathing, headache or visual disturbances     Call MD for:  redness, tenderness, or signs of infection (pain, swelling, redness, odor or green/yellow discharge around incision site)     Call MD for:  hives     Call MD for:  persistent dizziness or light-headedness     Call MD for:  extreme fatigue     No dressing needed     Activity as tolerated     Shower on day dressing removed (No bath)   Order Comments: Ok to shower in 48 hours        TIME SPENT ON DISCHARGE: 30 minutes

## 2024-10-15 NOTE — ANESTHESIA PROCEDURE NOTES
Intubation    Date/Time: 10/15/2024 9:20 AM    Performed by: Bernabe Henry CRNA  Authorized by: Dustin Dudley MD    Intubation:     Induction:  Intravenous    Intubated:  Postinduction    Mask Ventilation:  Easy mask    Attempts:  1    Attempted By:  CRNA    Method of Intubation:  Direct    Blade:  Ashley 4    Laryngeal View Grade: Grade IIA - cords partially seen      Difficult Airway Encountered?: No      Complications:  None    Airway Device:  Oral endotracheal tube    Airway Device Size:  7.5    Style/Cuff Inflation:  Cuffed (inflated to minimal occlusive pressure)    Tube secured:  22    Secured at:  The lips    Placement Verified By:  Capnometry    Complicating Factors:  Large/floppy epiglottis    Findings Post-Intubation:  BS equal bilateral

## 2024-10-15 NOTE — TRANSFER OF CARE
"Anesthesia Transfer of Care Note    Patient: Maki Luong    Procedure(s) Performed: Procedure(s) (LRB):  XI ROBOTIC REPAIR, HERNIA, INGUINAL (Left)    Patient location: PACU    Anesthesia Type: general    Transport from OR: Transported from OR on room air with adequate spontaneous ventilation    Post pain: adequate analgesia    Post assessment: no apparent anesthetic complications    Post vital signs: stable    Level of consciousness: responds to stimulation    Nausea/Vomiting: no nausea/vomiting    Complications: none    Transfer of care protocol was followed      Last vitals: Visit Vitals  BP (!) 166/111   Pulse 71   Temp 36.5 °C (97.7 °F) (Temporal)   Resp 18   Ht 6' 2" (1.88 m)   Wt 109.1 kg (240 lb 10.1 oz)   SpO2 97%   BMI 30.90 kg/m²     "

## 2024-10-16 NOTE — ANESTHESIA POSTPROCEDURE EVALUATION
Anesthesia Post Evaluation    Patient: Maki Luong    Procedure(s) Performed: Procedure(s) (LRB):  XI ROBOTIC REPAIR, HERNIA, INGUINAL (Left)    Final Anesthesia Type: general      Patient location during evaluation: PACU  Patient participation: Yes- Able to Participate  Level of consciousness: awake and alert and oriented  Post-procedure vital signs: reviewed and stable  Pain management: adequate  Airway patency: patent  LIV mitigation strategies: Verification of full reversal of neuromuscular block  PONV status at discharge: No PONV  Anesthetic complications: no      Cardiovascular status: blood pressure returned to baseline and hemodynamically stable  Respiratory status: unassisted  Hydration status: euvolemic  Follow-up not needed.              Vitals Value Taken Time   /72 10/15/24 1315   Temp 36.8 °C (98.2 °F) 10/15/24 1135   Pulse 79 10/15/24 1322   Resp 99 10/15/24 1322   SpO2 97 % 10/15/24 1322   Vitals shown include unfiled device data.      Event Time   Out of Recovery 13:21:14         Pain/Chantal Score: Pain Rating Prior to Med Admin: 6 (10/15/2024 12:15 PM)  Chantal Score: 10 (10/15/2024  1:26 PM)

## 2024-10-19 ENCOUNTER — NURSE TRIAGE (OUTPATIENT)
Dept: ADMINISTRATIVE | Facility: CLINIC | Age: 44
End: 2024-10-19
Payer: MEDICAID

## 2024-10-19 NOTE — TELEPHONE ENCOUNTER
Patient's wife calling in. Patient had left inguinal hernia repair on Tuesday and today started with testicle swelling. Called OCP and spoke with Dr SHARI Trevizo who recommended tight underwear and elevate scrotum. Watch for any pain, skin changes or signs of infection to incision sites. Updated patient's wife. Instructed to call back with additional questions or worsening of symptoms. Patient verbalized understanding.       Reason for Disposition   [1] Caller has URGENT question AND [2] triager unable to answer question    Additional Information   Negative: Sounds like a life-threatening emergency to the triager   Negative: [1] Widespread rash AND [2] bright red, sunburn-like   Negative: [1] SEVERE headache AND [2] after spinal (epidural) anesthesia   Negative: [1] Vomiting AND [2] persists > 4 hours   Negative: [1] Vomiting AND [2] abdomen looks much more swollen than usual   Negative: [1] Drinking very little AND [2] dehydration suspected (e.g., no urine > 12 hours, very dry mouth, very lightheaded)   Negative: Patient sounds very sick or weak to the triager   Negative: Sounds like a serious complication to the triager   Negative: Fever > 100.4 F (38.0 C)   Negative: [1] SEVERE post-op pain (e.g., excruciating, pain scale 8-10) AND [2] not controlled with pain medications    Protocols used: Post-Op Symptoms and [id number]

## 2024-10-21 ENCOUNTER — TELEPHONE (OUTPATIENT)
Dept: SURGERY | Facility: CLINIC | Age: 44
End: 2024-10-21
Payer: MEDICAID

## 2024-10-21 NOTE — TELEPHONE ENCOUNTER
----- Message from Vinay sent at 10/21/2024  3:22 PM CDT -----  Contact: [phone]  Type:  Patient Returning Call    Who Called:wife [phone]  Who Left Message for Patient:  Does the patient know what this is regarding?:how long do he have to wait to start eating solid food... normal food  Would the patient rather a call back or a response via [facility]? Call  back  Best Call Back Number: [phone]  Additional Information: [id number] [phone]

## 2024-10-21 NOTE — TELEPHONE ENCOUNTER
Return call to patient after he called the nurse triage line over the weekend with concern of scrotal swelling.  Left voicemail informing of purpose of call with good call back number.    Lisa Cornell PA-C  [facility]

## 2024-10-30 ENCOUNTER — OFFICE VISIT (OUTPATIENT)
Dept: SURGERY | Facility: CLINIC | Age: 44
End: 2024-10-30
Payer: MEDICAID

## 2024-10-30 VITALS
HEIGHT: 74 IN | HEART RATE: 89 BPM | TEMPERATURE: 98 F | DIASTOLIC BLOOD PRESSURE: 94 MMHG | BODY MASS INDEX: 30.27 KG/M2 | WEIGHT: 235.88 LBS | SYSTOLIC BLOOD PRESSURE: 140 MMHG

## 2024-10-30 DIAGNOSIS — K40.90 LEFT INGUINAL HERNIA: Primary | ICD-10-CM

## 2024-10-30 PROCEDURE — 99999 PR PBB SHADOW E&M-EST. PATIENT-LVL III: CPT | Mod: PBBFAC,,,

## 2024-10-30 PROCEDURE — 3077F SYST BP >= 140 MM HG: CPT | Mod: CPTII,,,

## 2024-10-30 PROCEDURE — 1159F MED LIST DOCD IN RCRD: CPT | Mod: CPTII,,,

## 2024-10-30 PROCEDURE — 1160F RVW MEDS BY RX/DR IN RCRD: CPT | Mod: CPTII,,,

## 2024-10-30 PROCEDURE — 99024 POSTOP FOLLOW-UP VISIT: CPT | Mod: ,,,

## 2024-10-30 PROCEDURE — 3080F DIAST BP >= 90 MM HG: CPT | Mod: CPTII,,,

## 2024-10-30 PROCEDURE — 99213 OFFICE O/P EST LOW 20 MIN: CPT | Mod: PBBFAC

## 2024-11-13 ENCOUNTER — OFFICE VISIT (OUTPATIENT)
Dept: SURGERY | Facility: CLINIC | Age: 44
End: 2024-11-13
Payer: MEDICAID

## 2024-11-13 VITALS
DIASTOLIC BLOOD PRESSURE: 93 MMHG | SYSTOLIC BLOOD PRESSURE: 150 MMHG | HEART RATE: 91 BPM | BODY MASS INDEX: 30.37 KG/M2 | WEIGHT: 236.56 LBS

## 2024-11-13 DIAGNOSIS — K40.90 LEFT INGUINAL HERNIA: Primary | ICD-10-CM

## 2024-11-13 PROCEDURE — 3080F DIAST BP >= 90 MM HG: CPT | Mod: CPTII,,,

## 2024-11-13 PROCEDURE — 1160F RVW MEDS BY RX/DR IN RCRD: CPT | Mod: CPTII,,,

## 2024-11-13 PROCEDURE — 99999 PR PBB SHADOW E&M-EST. PATIENT-LVL III: CPT | Mod: PBBFAC,,,

## 2024-11-13 PROCEDURE — 1159F MED LIST DOCD IN RCRD: CPT | Mod: CPTII,,,

## 2024-11-13 PROCEDURE — 99024 POSTOP FOLLOW-UP VISIT: CPT | Mod: ,,,

## 2024-11-13 PROCEDURE — 99213 OFFICE O/P EST LOW 20 MIN: CPT | Mod: PBBFAC

## 2024-11-13 PROCEDURE — 3077F SYST BP >= 140 MM HG: CPT | Mod: CPTII,,,

## 2024-11-13 NOTE — PROGRESS NOTES
History & Physical    Subjective     History of Present Illness:  Patient is a 44 y.o. male referred for left inguinal hernia.  He reports bulge down his left groin with intermittent discomfort right above this area.  Recently underwent CT scan showing left inguinal hernia containing sigmoid colon.  Prior to these episodes he was having intermittent epigastric pain and diagnosed with pancreatitis.    Interval History:  Since last clinic visit, the patient has improved.  He still has significant swelling of the left hemiscrotum, but this has decreased in size.  He has pain in the area is improving as well but not completely resolved.  He has been adhering to physical restrictions.  He is urinating without issue.  He is tolerating a regular diet and having normal bowel movements.  He denies any fevers, chills, nausea, vomiting.    Chief Complaint   Patient presents with    Post-op Evaluation       Review of patient's allergies indicates:  No Known Allergies    Current Outpatient Medications   Medication Sig Dispense Refill    amLODIPine (NORVASC) 5 MG tablet Take 5 mg by mouth once daily.       No current facility-administered medications for this visit.       Past Medical History:   Diagnosis Date    Essential (primary) hypertension     Left inguinal hernia 10/15/2024     Past Surgical History:   Procedure Laterality Date    CHOLECYSTECTOMY      ROBOT-ASSISTED LAPAROSCOPIC REPAIR OF INGUINAL HERNIA USING DA MAURILIO XI Left 10/15/2024    Procedure: XI ROBOTIC REPAIR, HERNIA, INGUINAL;  Surgeon: Gigi Gomez MD;  Location: [facility];  Service: General;  Laterality: Left;     No family history on file.  Social History     Tobacco Use    Smoking status: Never    Smokeless tobacco: Never   Substance Use Topics    Alcohol use: Not Currently    Drug use: Never        Review of Systems:  Review of Systems   Constitutional:  Negative for appetite change, chills, fatigue, fever and unexpected weight change.   HENT:  Negative for  congestion.    Eyes:  Negative for visual disturbance.   Respiratory:  Negative for shortness of breath.    Cardiovascular:  Negative for chest pain.   Gastrointestinal:  Negative for abdominal distention, abdominal pain, constipation, nausea, rectal pain and vomiting.   Genitourinary:  Positive for scrotal swelling and testicular pain. Negative for dysuria and hematuria.   Musculoskeletal:  Negative for arthralgias.   Skin:  Negative for rash.   Neurological:  Negative for light-headedness.   Hematological:  Negative for adenopathy.          Objective     Vital Signs (Most Recent)  Pulse: 91 (11/13/24 1432)  BP: (!) 150/93 (11/13/24 1432)     107.3 kg (236 lb 8.9 oz)     Physical Exam:  Physical Exam  Vitals reviewed.   Constitutional:       General: He is not in acute distress.     Appearance: He is well-developed. He is not toxic-appearing.   HENT:      Head: Normocephalic and atraumatic.      Right Ear: External ear normal.      Left Ear: External ear normal.      Nose: Nose normal.      Mouth/Throat:      Mouth: Mucous membranes are moist.   Eyes:      Extraocular Movements: Extraocular movements intact.      Conjunctiva/sclera: Conjunctivae normal.   Cardiovascular:      Rate and Rhythm: Normal rate.   Pulmonary:      Effort: Pulmonary effort is normal. No respiratory distress.   Abdominal:      Comments: Abdomen is soft, nontender, nondistended.  Incisions are well-healed.  The left inguinal canal hematoma has continued to decrease in size.  The testicle is distinctly palpable beneath the hematoma.  There is no significant erythema or warmth to suggest infection.   Genitourinary:     Comments: Right testicle palpable within the scrotum, nontender.  Significant hematoma of the left hemiscrotum is improving with now distinctly palpable left testicle within the left hemiscrotum  Musculoskeletal:      Cervical back: Normal range of motion and neck supple.   Skin:     General: Skin is warm and dry.   Neurological:       Mental Status: He is alert and oriented to person, place, and time.   Psychiatric:         Behavior: Behavior normal.         Diagnostic Results:  CT:   Impression:  Large left inguinal hernia containing portion of sigmoid colon without evidence of obstruction.       Assessment and Plan     44-year-old male with left inguinal hernia who is recovering as expected with hematoma of the left groin/hemiscrotum.    - discussed that it is not uncommon to have a significant fluid collection in the area of previous hernias after hernia this large is repaired.  Recommend continued warm compresses, supportive underwear, and elevation of the scrotum when at rest.  Educated on the signs and symptoms of infection and advised to call should these symptoms occur.  - continue light duty, no lifting over 10 lb, no repetitive bending, twisting, pushing, pulling for a total of 6 weeks postoperatively to reduce the risk of hernia recurrence.  The patient voiced understanding.  - return to clinic 1 month or sooner if needed.      Lisa Cornell PA-C  [facility]

## 2024-12-11 ENCOUNTER — OFFICE VISIT (OUTPATIENT)
Dept: SURGERY | Facility: CLINIC | Age: 44
End: 2024-12-11
Payer: MEDICAID

## 2024-12-11 VITALS
DIASTOLIC BLOOD PRESSURE: 105 MMHG | SYSTOLIC BLOOD PRESSURE: 173 MMHG | WEIGHT: 245.81 LBS | HEART RATE: 78 BPM | BODY MASS INDEX: 31.56 KG/M2

## 2024-12-11 DIAGNOSIS — K40.90 LEFT INGUINAL HERNIA: Primary | ICD-10-CM

## 2024-12-11 PROCEDURE — 99024 POSTOP FOLLOW-UP VISIT: CPT | Mod: ,,,

## 2024-12-11 PROCEDURE — 3080F DIAST BP >= 90 MM HG: CPT | Mod: CPTII,,,

## 2024-12-11 PROCEDURE — 99999 PR PBB SHADOW E&M-EST. PATIENT-LVL III: CPT | Mod: PBBFAC,,,

## 2024-12-11 PROCEDURE — 99213 OFFICE O/P EST LOW 20 MIN: CPT | Mod: PBBFAC

## 2024-12-11 PROCEDURE — 3077F SYST BP >= 140 MM HG: CPT | Mod: CPTII,,,

## 2024-12-11 PROCEDURE — 1160F RVW MEDS BY RX/DR IN RCRD: CPT | Mod: CPTII,,,

## 2024-12-11 PROCEDURE — 1159F MED LIST DOCD IN RCRD: CPT | Mod: CPTII,,,

## 2024-12-11 NOTE — LETTER
December 11, 2024      [facility] [address]  [address] [facility]  [place] [address]  Phone: [phone]  Fax: [phone]       Patient: Maki Luong   YOB: 1980  Date of Visit: 12/11/2024    To Whom It May Concern:    Connie Luong  was at [facility] on 12/11/2024. He has been under our care since 10/15/2024. The patient may return to work/school on 12/26/2024 with no restrictions. If you have any questions or concerns, or if I can be of further assistance, please do not hesitate to contact me.    Sincerely,        Lisa Cornell PA-C

## 2024-12-11 NOTE — PROGRESS NOTES
History & Physical    Subjective     History of Present Illness:  Patient is a 44 y.o. male referred for left inguinal hernia.  He reports bulge down his left groin with intermittent discomfort right above this area.  Recently underwent CT scan showing left inguinal hernia containing sigmoid colon.  Prior to these episodes he was having intermittent epigastric pain and diagnosed with pancreatitis.    Interval History:  Since last clinic visit, the patient has continued to improve. Denies any further pain, and the swelling has continued to improve. Denies fevers and chills.     Chief Complaint   Patient presents with    Post-op Evaluation     F/u left inguinal hernia       Review of patient's allergies indicates:  No Known Allergies    Current Outpatient Medications   Medication Sig Dispense Refill    amLODIPine (NORVASC) 5 MG tablet Take 5 mg by mouth once daily.       No current facility-administered medications for this visit.       Past Medical History:   Diagnosis Date    Essential (primary) hypertension     Left inguinal hernia 10/15/2024     Past Surgical History:   Procedure Laterality Date    CHOLECYSTECTOMY      ROBOT-ASSISTED LAPAROSCOPIC REPAIR OF INGUINAL HERNIA USING DA MAURILIO XI Left 10/15/2024    Procedure: XI ROBOTIC REPAIR, HERNIA, INGUINAL;  Surgeon: Gigi Gomez MD;  Location: [facility];  Service: General;  Laterality: Left;     No family history on file.  Social History     Tobacco Use    Smoking status: Never    Smokeless tobacco: Never   Substance Use Topics    Alcohol use: Not Currently    Drug use: Never        Review of Systems:  Review of Systems   Constitutional:  Negative for appetite change, chills, fatigue, fever and unexpected weight change.   HENT:  Negative for congestion.    Eyes:  Negative for visual disturbance.   Respiratory:  Negative for shortness of breath.    Cardiovascular:  Negative for chest pain.   Gastrointestinal:  Negative for abdominal distention, abdominal pain,  constipation, nausea, rectal pain and vomiting.   Genitourinary:  Negative for dysuria, hematuria, scrotal swelling and testicular pain.   Musculoskeletal:  Negative for arthralgias.   Skin:  Negative for rash.   Neurological:  Negative for light-headedness.   Hematological:  Negative for adenopathy.          Objective     Vital Signs (Most Recent)  Pulse: 78 (12/11/24 1305)  BP: (!) 173/105 (12/11/24 1305)     111.5 kg (245 lb 13 oz)     Physical Exam:  Physical Exam  Vitals reviewed.   Constitutional:       General: He is not in acute distress.     Appearance: He is well-developed. He is not toxic-appearing.   HENT:      Head: Normocephalic and atraumatic.      Right Ear: External ear normal.      Left Ear: External ear normal.      Nose: Nose normal.      Mouth/Throat:      Mouth: Mucous membranes are moist.   Eyes:      Extraocular Movements: Extraocular movements intact.      Conjunctiva/sclera: Conjunctivae normal.   Cardiovascular:      Rate and Rhythm: Normal rate.   Pulmonary:      Effort: Pulmonary effort is normal. No respiratory distress.   Abdominal:      Comments: Abdomen is soft, nontender, nondistended.  Incisions are well-healed.  The left inguinal canal hematoma has continued to decrease in size.  The testicle is distinctly palpable beneath the hematoma.  There is no significant erythema or warmth to suggest infection.   Genitourinary:     Comments: Right testicle palpable within the scrotum, nontender.  Significant hematoma of the left hemiscrotum is improving/resolving with distinctly palpable left testicle within the left hemiscrotum  Musculoskeletal:      Cervical back: Normal range of motion and neck supple.   Skin:     General: Skin is warm and dry.   Neurological:      Mental Status: He is alert and oriented to person, place, and time.   Psychiatric:         Behavior: Behavior normal.         Diagnostic Results:  CT:   Impression:  Large left inguinal hernia containing portion of sigmoid  colon without evidence of obstruction.       Assessment and Plan     44-year-old male with left inguinal hernia who is recovering as expected with hematoma of the left groin/hemiscrotum which is nearly resolved.    -Fullness should continue to improve.   - No further restrictions  - RTC as needed or if any issues arise      Lisa Cornell PA-C  [facility]

## (undated) DEVICE — PAD PINK TRENDELENBURG POS XL

## (undated) DEVICE — SUT STRATAFIX SPIRAL 20CM

## (undated) DEVICE — SUT MCRYL PLUS 4-0 PS2 27IN